# Patient Record
Sex: FEMALE
[De-identification: names, ages, dates, MRNs, and addresses within clinical notes are randomized per-mention and may not be internally consistent; named-entity substitution may affect disease eponyms.]

---

## 2022-11-07 ENCOUNTER — APPOINTMENT (OUTPATIENT)
Dept: ENDOCRINOLOGY | Facility: CLINIC | Age: 87
End: 2022-11-07

## 2022-11-07 PROBLEM — Z00.00 ENCOUNTER FOR PREVENTIVE HEALTH EXAMINATION: Status: ACTIVE | Noted: 2022-11-07

## 2022-11-14 ENCOUNTER — APPOINTMENT (OUTPATIENT)
Dept: ENDOCRINOLOGY | Facility: CLINIC | Age: 87
End: 2022-11-14

## 2022-11-14 VITALS — DIASTOLIC BLOOD PRESSURE: 80 MMHG | HEIGHT: 61 IN | HEART RATE: 54 BPM | SYSTOLIC BLOOD PRESSURE: 100 MMHG

## 2022-11-14 DIAGNOSIS — Z87.898 PERSONAL HISTORY OF OTHER SPECIFIED CONDITIONS: ICD-10-CM

## 2022-11-14 DIAGNOSIS — R79.89 OTHER SPECIFIED ABNORMAL FINDINGS OF BLOOD CHEMISTRY: ICD-10-CM

## 2022-11-14 PROCEDURE — 99203 OFFICE O/P NEW LOW 30 MIN: CPT

## 2022-11-14 NOTE — ASSESSMENT
[FreeTextEntry1] : TSH is at age appropriate target of 5-10 in this very elderly patient w/ multiple comorbidities. she does not have hypothyroidism, LT4 should be stopped given risks of iatrogenic hyperthyroidism and polypharmacy. repeat TFTs every 6 months. HHA Verbalized understanding and agrees with treatment plan, will contact MD and seek emergency medical care if condition changes.\par f/u PRN

## 2022-11-14 NOTE — HISTORY OF PRESENT ILLNESS
[FreeTextEntry1] : 86 y/o F w/ Hx of senile dementia, HTN, CAD w/ CHF, COPD, chronic dysphagia\par here for initial evaluation and management of abnormal thyroid test results\par generally feels well and endorses no acute complaints.\par pt is minimally verbal at baseline. a/b HHA who provides hx. she reports unintentional weight loss, poor appetite, progressive cognitive decline. has had frequent hosp for failure to thrive and CHF exacerbations. TFTs on 8/2022 after hospitalization showed TSH of 8, fT4 of 1. unable to obtain ROS from pt\par \par